# Patient Record
Sex: FEMALE | Race: BLACK OR AFRICAN AMERICAN | Employment: OTHER | URBAN - METROPOLITAN AREA
[De-identification: names, ages, dates, MRNs, and addresses within clinical notes are randomized per-mention and may not be internally consistent; named-entity substitution may affect disease eponyms.]

---

## 2022-10-13 ENCOUNTER — OFFICE VISIT (OUTPATIENT)
Dept: ORTHOPEDIC SURGERY | Age: 67
End: 2022-10-13
Payer: MEDICARE

## 2022-10-13 DIAGNOSIS — G89.29 CHRONIC LEFT-SIDED LOW BACK PAIN WITH LEFT-SIDED SCIATICA: ICD-10-CM

## 2022-10-13 DIAGNOSIS — M54.42 CHRONIC LEFT-SIDED LOW BACK PAIN WITH LEFT-SIDED SCIATICA: ICD-10-CM

## 2022-10-13 DIAGNOSIS — M25.552 LEFT HIP PAIN: ICD-10-CM

## 2022-10-13 DIAGNOSIS — M70.62 GREATER TROCHANTERIC BURSITIS OF LEFT HIP: Primary | ICD-10-CM

## 2022-10-13 PROBLEM — I10 HTN (HYPERTENSION): Status: ACTIVE | Noted: 2019-10-04

## 2022-10-13 PROBLEM — M81.0 OSTEOPOROSIS: Status: ACTIVE | Noted: 2019-10-04

## 2022-10-13 PROBLEM — K21.9 GASTROESOPHAGEAL REFLUX DISEASE: Status: ACTIVE | Noted: 2019-10-04

## 2022-10-13 PROBLEM — G47.33 OSA (OBSTRUCTIVE SLEEP APNEA): Status: ACTIVE | Noted: 2021-11-08

## 2022-10-13 PROBLEM — I51.89 GRADE I DIASTOLIC DYSFUNCTION: Status: ACTIVE | Noted: 2021-12-30

## 2022-10-13 PROBLEM — M48.12: Status: ACTIVE | Noted: 2020-03-11

## 2022-10-13 PROCEDURE — 20611 DRAIN/INJ JOINT/BURSA W/US: CPT | Performed by: PHYSICAL MEDICINE & REHABILITATION

## 2022-10-13 PROCEDURE — 1123F ACP DISCUSS/DSCN MKR DOCD: CPT | Performed by: PHYSICAL MEDICINE & REHABILITATION

## 2022-10-13 PROCEDURE — 3017F COLORECTAL CA SCREEN DOC REV: CPT | Performed by: PHYSICAL MEDICINE & REHABILITATION

## 2022-10-13 PROCEDURE — G8427 DOCREV CUR MEDS BY ELIG CLIN: HCPCS | Performed by: PHYSICAL MEDICINE & REHABILITATION

## 2022-10-13 PROCEDURE — G8432 DEP SCR NOT DOC, RNG: HCPCS | Performed by: PHYSICAL MEDICINE & REHABILITATION

## 2022-10-13 PROCEDURE — 99203 OFFICE O/P NEW LOW 30 MIN: CPT | Performed by: PHYSICAL MEDICINE & REHABILITATION

## 2022-10-13 PROCEDURE — G8421 BMI NOT CALCULATED: HCPCS | Performed by: PHYSICAL MEDICINE & REHABILITATION

## 2022-10-13 PROCEDURE — 1101F PT FALLS ASSESS-DOCD LE1/YR: CPT | Performed by: PHYSICAL MEDICINE & REHABILITATION

## 2022-10-13 PROCEDURE — G8756 NO BP MEASURE DOC: HCPCS | Performed by: PHYSICAL MEDICINE & REHABILITATION

## 2022-10-13 PROCEDURE — G8536 NO DOC ELDER MAL SCRN: HCPCS | Performed by: PHYSICAL MEDICINE & REHABILITATION

## 2022-10-13 PROCEDURE — 1090F PRES/ABSN URINE INCON ASSESS: CPT | Performed by: PHYSICAL MEDICINE & REHABILITATION

## 2022-10-13 RX ORDER — ALLOPURINOL 100 MG/1
TABLET ORAL
COMMUNITY
Start: 2022-07-27

## 2022-10-13 RX ORDER — LISINOPRIL 20 MG/1
TABLET ORAL
COMMUNITY

## 2022-10-13 RX ORDER — PRAVASTATIN SODIUM 10 MG/1
TABLET ORAL
COMMUNITY

## 2022-10-13 RX ORDER — FUROSEMIDE 40 MG/1
TABLET ORAL
COMMUNITY

## 2022-10-13 RX ORDER — ERGOCALCIFEROL 1.25 MG/1
CAPSULE ORAL
COMMUNITY

## 2022-10-13 RX ORDER — BUPIVACAINE HYDROCHLORIDE 2.5 MG/ML
3 INJECTION, SOLUTION INFILTRATION; PERINEURAL ONCE
Status: COMPLETED | OUTPATIENT
Start: 2022-10-13 | End: 2022-10-13

## 2022-10-13 RX ORDER — AMLODIPINE BESYLATE 5 MG/1
TABLET ORAL
COMMUNITY
Start: 2022-07-26

## 2022-10-13 RX ORDER — METHOCARBAMOL 750 MG/1
TABLET, FILM COATED ORAL
COMMUNITY
Start: 2022-09-08

## 2022-10-13 RX ORDER — METHYLPREDNISOLONE 4 MG/1
TABLET ORAL
COMMUNITY
Start: 2022-09-16 | End: 2022-10-13

## 2022-10-13 RX ORDER — ENOXAPARIN SODIUM 100 MG/ML
INJECTION SUBCUTANEOUS
COMMUNITY
End: 2022-10-13

## 2022-10-13 RX ORDER — FAMOTIDINE 20 MG/1
TABLET, FILM COATED ORAL
COMMUNITY
End: 2022-10-13

## 2022-10-13 RX ORDER — CELECOXIB 200 MG/1
CAPSULE ORAL
COMMUNITY
End: 2022-10-13

## 2022-10-13 RX ORDER — MELOXICAM 15 MG/1
TABLET ORAL
COMMUNITY

## 2022-10-13 RX ORDER — ACETAMINOPHEN 325 MG/1
650 TABLET ORAL 3 TIMES DAILY
COMMUNITY

## 2022-10-13 RX ORDER — TRAMADOL HYDROCHLORIDE 50 MG/1
TABLET ORAL
COMMUNITY
End: 2022-10-13

## 2022-10-13 RX ORDER — WARFARIN 3 MG/1
TABLET ORAL
COMMUNITY
End: 2022-10-13

## 2022-10-13 RX ORDER — POTASSIUM CHLORIDE 750 MG/1
TABLET, FILM COATED, EXTENDED RELEASE ORAL
COMMUNITY

## 2022-10-13 RX ORDER — TRIAMCINOLONE ACETONIDE 40 MG/ML
40 INJECTION, SUSPENSION INTRA-ARTICULAR; INTRAMUSCULAR ONCE
Status: COMPLETED | OUTPATIENT
Start: 2022-10-13 | End: 2022-10-13

## 2022-10-13 RX ADMIN — BUPIVACAINE HYDROCHLORIDE 7.5 MG: 2.5 INJECTION, SOLUTION INFILTRATION; PERINEURAL at 13:15

## 2022-10-13 RX ADMIN — TRIAMCINOLONE ACETONIDE 40 MG: 40 INJECTION, SUSPENSION INTRA-ARTICULAR; INTRAMUSCULAR at 13:16

## 2022-10-13 NOTE — PROGRESS NOTES
[address]  [address], [address]  [employer], [address] [place] [address] Street  Phone: [phone]  Fax: [phone]      Patient: Lobo Santana                                                                              MRN: [id number]        YOB: 1955          AGE: 77 y.o. PCP: None  Date:  10/13/22    Reason for Consultation: Hip Pain (Left) and Leg Pain (Left)      HPI:  Lobo Santana is a 77 y.o. female with relevant PMH of HTN, SHELBY, gout, prior PE- no longer on anticoagulation,  bilateral hip replacement  who presents with left low back/gluteal  pain radiating down the left leg towards the knee. The pain began August 2022. Denies any precipitating incident or trauma. Neurologic symptoms: + numbness- left leg, tingling, weakness, bowel or bladder changes. No recent falls      Location: The pain is located in the left low back pain/buttock pain   Radiation: The pain does radiate down the left lateral hip and leg towards the knee    Pain Score: Currently: 7/10    Quality: Pain is of a Stabbing quality. Aggravating: Pain is exacerbated by walking, standing, and exercise  Alleviating: The pain is alleviated by sitting    Prior Treatments:  Physical therapy: NO  Injections:NO    Previous Medications: prednisone- helped for a few weeks , celebrex, tramadol  Current Medications: methocarbamol, meloxicam  Previous work-up has included:     Past Medical History:   Past Medical History:   Diagnosis Date    HTN (hypertension)       Past Surgical History:   Past Surgical History:   Procedure Laterality Date    HX CATARACT REMOVAL Bilateral     HX CHOLECYSTECTOMY  2016    HX ORTHOPAEDIC Bilateral     hip repalcement 2017, 2013    HX TUBAL LIGATION        SocHx:   Social History     Tobacco Use    Smoking status: Never    Smokeless tobacco: Never   Substance Use Topics    Alcohol use: Never      FamHx:? History reviewed.  No pertinent family history. Current Medications:    Current Outpatient Medications   Medication Sig Dispense Refill    acetaminophen (TYLENOL) 325 mg tablet Take 650 mg by mouth three (3) times daily. allopurinoL (ZYLOPRIM) 100 mg tablet       amLODIPine (NORVASC) 5 mg tablet       ergocalciferol (ERGOCALCIFEROL) 1,250 mcg (50,000 unit) capsule ergocalciferol (vitamin D2) 1,250 mcg (50,000 unit) capsule   TAKE 1 CAPSULE BY MOUTH EVERY WEEK FOR VITAMIN D DEFICIENCY      furosemide (LASIX) 40 mg tablet furosemide 40 mg tablet   TAKE 1 TABLET BY MOUTH TWICE A DAY      lisinopriL (PRINIVIL, ZESTRIL) 20 mg tablet lisinopril 20 mg tablet   TAKE 1 TABLET BY MOUTH EVERY DAY      meloxicam (MOBIC) 15 mg tablet meloxicam 15 mg tablet   TAKE 1 TABLET BY MOUTH EVERY DAY WITH FOOD FOR ARTHRITIS      methocarbamoL (ROBAXIN) 750 mg tablet TAKE 2 TABLETS BY MOUTH TWICE A DAY AS NEEDED FOR PAIN      potassium chloride SR (KLOR-CON 10) 10 mEq tablet potassium chloride ER 10 mEq tablet,extended release   TAKE 1 TABLET BY MOUTH EVERY DAY      pravastatin (PRAVACHOL) 10 mg tablet pravastatin 10 mg tablet   TAKE 1 TABLET BY MOUTH EVERY DAY        Allergies: Allergies   Allergen Reactions    Penicillins Hives and Itching        Review of Systems:   Gen:    Denied fevers, chills, malaise, fatigue, weight changes   Resp: Denied shortness of breath, cough, wheezing   CVS: Denied chest pain, palpitations   : Denied urinary urgency, frequency, incontinence   GI: Denied nausea, vomiting, constipation, diarrhea   Skin: Denied rashes, wounds   Psych: Denied anxiety, depression   Vasc: Denied claudication, ulcers   Hem: Denied easy bruising/bleeding   MSK: See HPI   Neuro: See HPI         Physical Exam     Vital Signs: There were no vitals taken for this visit. General: ??????? Well nourished and well developed female without any acute distress   Psychiatric: ?  Alert and oriented x 3 with normal mood    HEENT: ????????   Atraumatic   Respiratory: Breathing non-labored and non dyspneic   CV: ???????????????? Peripheral pulses intact, no peripheral edema   Skin: ????????????? No rashes       Neurologic: ?? Sensation: normal and grossly intact thebilateral, lower extremity(s)   Strength: 5/5 in the bilateral, lower extremity(s)  Reflexes: reveals 2+ symmetric DTRs throughout  LE  Gait: normal     Musculoskeletal: Lumbar Exam     Inspection:   Alignment: Abnormal   Atrophy: None         Tenderness to Palpation:   Lumbar paraspinals Positive left  Lumbar spinous processes Negative  SI Joint:  Positive left  Gluteal:Positive  left  Greater trochanter: Positive++ left      ROM:   Lumbar ROM: Abnormal pain with flexion and extension  Lumbar facet loading: Positive  Hip ROM: No reproduction of pain with movement     Special Tests      Slump test: Negative  SLR: Negative  CHAVEZ: Positive low back  Log Roll: Negative             Medical Decision Making:    Images: The imaging results as well as the actual images of the studies below were reviewed, visualized and interpreted by me. Labs: The results below were reviewed. X-ray lumbar spine 10/13/22- anterolisthesis L4/5, scoliosis, left convex lumbar, disc space narrowing, anterior osteophytes  X-ray left hip limited view- hardware appears in place     Assessment:   -left GT bursitis  -lumbar spondylosis  -b/l hip replacement    Plan:      -Physical therapy -  referral to start PT for gluteal strengthening  -Medications - continue current medications. Counseled regarding side effects and appropriate administration of medications.    -Diagnostics/Imaging - x-ray lumbar spine , x-ray left hip   -Injections - left GT bursa injection- see procedure note below   -Lifestyle - Encouraged weight loss   -Education - The patient's diagnosis, prognosis and treatment options were discussed today. All questions were answered. F/U - in 8 week(s) or sooner if needed.   Consider further imaging lumbar spine [address] [facility]          [address]  [address], [address] [address],[address]  [address]  Phone: [phone]  Fax: [phone]      Encounter Date: 10/13/2022          Diagnosis: left GT bursitis      Indication: left lateral hip pain          Procedure: Sonographically guided left sub gluteus minimus injection         Informed Consent: Following denial of allergy and review of potential side effects and complications including, but not limited to, infection, allergic reaction, local tissue breakdown, injury to soft tissue and/or nerves and seizure, the patient indicated understanding and agreed to proceed. Procedural pause conducted to verify: correct patient identity, procedure to be performed and, as applicable, correct side and site, correct patient position, availability of any special equipment or other special requirements. Justification for use of ultrasound guidance: The use of direct sonographic visualization (rather than a non-guided injection) was required to ensure accurate injection placement for diagnostic specificity, to maximize clinical efficacy, and for safety purposes to minimize risk of bleeding or injury to nearby structures. Technique: The procedure was carried out under sterile technique utilizing a sterile ultrasound transducer cover and sterile ultrasound gel. Pre-procedural scanning was performed to determine optimal approach for the procedure. The patient was prepped and draped in the usual sterile fashion. Patient position: sidelying on the right side     Approach: posterior to anterior     Needle: 21 gauge 2 inch     Details: Live sonographic guidance with a 12 MHz transducer was used throughout the procedure. A 25 gauge 1.5 inch needle with 2 mL 2% polocaine- (lot [id number] exp 09/24)was used for local anesthesia.   Then a 21 gauge 2 inch needle was advanced into the target area 2 ml of 0.25% bupivacaine and 40mg /1ml kenalog was injected. Post-Procedure Instructions: The patient tolerated the procedure well without complication and was discharged in good condition after a short observation period. The patient was instructed to avoid submerging the procedure site in water for 48-72 hours. The patient was instructed to contact me with any questions pertaining to the procedure          Key images were saved.           Impression: Technically successful sonographically guided left sub gluteus medius bursa injection        Colleen Bearden MD

## 2022-10-13 NOTE — LETTER
NAME: Stefanie Phelps  : 1955  MRN: [id number]    PROCEDURAL INFORMED CONSENT FOR OPERATION / PROCEDURE     1. I (we),      Stefanie Lenin      authorize    Stewart Jurado MD       and/or such assistants as may be selected by him/her, to perform the following operation/procedures    Left greater trochanteric bursa injection ultrasound guided      Note: If unable to obtain consent prior to an emergent procedure, document the emergent reason in the medical record. This procedure has been explained to my (our) satisfaction and included in the explanation was:   A) the intended benefit, nature, and extent of the procedure to be performed;  B) the significant risks involved and the probability of success;  C) alternative procedures and methods of treatment;  D) the dangers and probable consequences of such alternatives (including no procedure or treatment); E) the expected consequences of the procedure on my future health;  F) whether other qualified individuals would be performing important surgical tasks and / or whether  would be present to advise or support the procedure. I (we) understand that there are other risks of infection and other serious complications in the pre-operative/procedural and postoperative/procedural stages of my (our) care. I (we) have asked all of the questions which I (we) thought were important in deciding whether or not to undergo treatment or diagnosis. These questions have been answered to my (our) satisfaction. I (we) understand that no assurance can be given that the procedure will be a success, and no guarantee or warranty of success has been given to me (us). 2.  It has been explained to me (us) that during the course of the operation/procedure, unforeseen conditions may be revealed that necessitate extension of the original procedure(s) or different procedure(s) than those set forth in Paragraph 1.  I (we) authorize and request that the above-named physician, his/her assistants or his/her designees, perform procedures as necessary and desirable if deemed to be in my (our) best interest.    3.  I acknowledge that other health care personnel may be observing this procedure for the purpose of medical education or other specified purposes as may be necessary as requested and/or approved by my (our) physician. 4.  I (we) consent to the disposal by the hospital Pathologist of the removed tissue, parts or organs in accordance with hospital policy. Page 1 of 2    NAME: Robert Ferguson  : 1955  MRN: [id number]    4. I do_____ do not______ consent to the use of a local infiltration pain blocking agent that will be used by my provider/surgical provider to help alleviate pain during my procedure. 6.  I do_____ do not_____ consent to an emergent blood transfusion in the case of a life-threatening situation that requires blood components to be administered. This consent is valid for 24 hours from the beginning of the procedure. 7.  This patient does _____ or does not ______currently have a DNR status/order. If DNR order is in place, obtain Addendum to the Surgical Consent for ALL Patients with a DNR Order to address jesse-operative status for limited intervention or DNR suspension. 8.  I have read and fully understand the above Consent for Operation/Procedure and that all blanks were completed before I signed the consent.       Robert Ferguson     Signature of Patient (or legal representative)  Printed Name / Relationship                10/13/2022 /         AM / PM   Witness to Signature  Printed Name   Date/Time        (If patient is unable to sign or is a minor, complete the following)               Patient is a minor, ____years of age, or unable to sign because: _____________________          ________________________________________________________________________  [facility] If a phone consent is obtained, consent will be documented by using two health care professionals, each affirming that the consenting party   has no questions and gives consent for the procedure discussed with the physician/provider. 10/13/2022 /               AM / PM   2nd Witness to phone consent  Printed Name   Date/Time     Informed Consent:  I have provided the explanation described above in section 1 to the patient and/or legal representative. I have provided the patient and/or legal representative with an opportunity to ask any questions about the proposed operation/procedure. Yara Cheney MD    10/13/2022   /            AM / PM   Provider / Proceduralist  Printed Name  Date/Time     This Provider / Gearl Kid performing the surgery is ONLY for Office-based procedures in [place]   [ x] Board certified or Board eligible by one of the [address] [place] [address] Rd., [address] of [employer], the [employer] of [facility] of the [facility], the [address] [place] [address] Rd., [address] of Podiatric Medicine, the [address] [place] [address] Rd., [address] of Foot and Ankle Surgery, or other board as approved by the hospital for medical staff appointment.             Page 2 of 2  Revised 8/2/2021

## 2022-10-13 NOTE — Clinical Note
Alverto Mclain, I am not able to see these patients x-rays in PACS- it say  \"The Accession Number: [id number] does not exist in the [address] [place] [address]. The data may have been deleted, moved or not yet added to the [facility] database.  \"  Thanks

## 2023-05-04 ENCOUNTER — OFFICE VISIT (OUTPATIENT)
Age: 68
End: 2023-05-04

## 2023-05-04 DIAGNOSIS — M54.16 RIGHT LUMBAR RADICULOPATHY: Primary | ICD-10-CM

## 2023-05-04 DIAGNOSIS — M25.561 CHRONIC PAIN OF BOTH KNEES: ICD-10-CM

## 2023-05-04 DIAGNOSIS — M70.62 GREATER TROCHANTERIC BURSITIS OF LEFT HIP: ICD-10-CM

## 2023-05-04 DIAGNOSIS — M25.562 CHRONIC PAIN OF BOTH KNEES: ICD-10-CM

## 2023-05-04 DIAGNOSIS — G89.29 CHRONIC PAIN OF BOTH KNEES: ICD-10-CM

## 2023-05-04 RX ORDER — LIDOCAINE HYDROCHLORIDE 10 MG/ML
5 INJECTION, SOLUTION INFILTRATION; PERINEURAL ONCE
Status: COMPLETED | OUTPATIENT
Start: 2023-05-04 | End: 2023-05-04

## 2023-05-04 RX ORDER — DIPHENHYDRAMINE HCL 2% ZINC ACETATE 0.1% 2; .1 G/100ML; G/100ML
SPRAY TOPICAL
COMMUNITY
Start: 2023-04-28

## 2023-05-04 RX ORDER — TRIAMCINOLONE ACETONIDE 40 MG/ML
40 INJECTION, SUSPENSION INTRA-ARTICULAR; INTRAMUSCULAR ONCE
Status: COMPLETED | OUTPATIENT
Start: 2023-05-04 | End: 2023-05-04

## 2023-05-04 RX ADMIN — TRIAMCINOLONE ACETONIDE 40 MG: 40 INJECTION, SUSPENSION INTRA-ARTICULAR; INTRAMUSCULAR at 09:34

## 2023-05-04 RX ADMIN — LIDOCAINE HYDROCHLORIDE 5 ML: 10 INJECTION, SOLUTION INFILTRATION; PERINEURAL at 09:32

## 2023-05-04 NOTE — PROGRESS NOTES
[address]  [address], [address]   [place], [address] [place] [address] Street   Phone: [phone]   Fax: [phone]         Patient: Erasmo Ny                                                                               MRN: [id number]         YOB: 1955           AGE: 77 y.o. PCP: None     Reason for Consultation: Hip Pain (Left) and Leg Pain (Left)         HPI:   Erasmo Ny is a 77 y.o. female with relevant PMH of HTN, RAMESH, gout, prior PE- no longer on anticoagulation,  bilateral hip replacement  who presented with left low back/gluteal  pain radiating  down the left leg towards the knee. We tried a GT bursa injection 10/2023 which helped. Today she reports pain radiating down the left leg towards the foot       The pain began August 2022. Denies any precipitating incident or trauma. She also reports bilateral knee pain. Has had viscosupplementation in the past which has been helpful        Neurologic symptoms: + numbness- left leg, tingling, weakness, bowel or bladder changes. No recent falls        Location: The pain is located in the left low back pain/buttock pain    Radiation: The pain does radiate down the left lateral hip and leg towards the knee  and foot   Pain Score: Currently: 7/10     Quality: Pain is of a Stabbing quality. Aggravating: Pain is exacerbated by walking, standing, and exercise   Alleviating:  The pain is alleviated by sitting      Prior Treatments:   Physical therapy: NO   Injections:   10/13/23- left GT bursa injection- good relief   2020- knee bilateral viscosupplementation   Previous Medications: prednisone- helped for a few weeks , celebrex, tramadol   Current Medications: methocarbamol, meloxicam   Previous work-up has included:     X-ray lumbar spine 10/13/22- anterolisthesis L4/5, scoliosis, left convex lumbar, disc space narrowing, anterior osteophytes   X-ray left hip limited view-

## 2023-05-12 ENCOUNTER — HOSPITAL ENCOUNTER (OUTPATIENT)
Facility: HOSPITAL | Age: 68
Setting detail: RECURRING SERIES
Discharge: HOME | End: 2023-05-15
Payer: MEDICARE

## 2023-05-12 PROCEDURE — 97110 THERAPEUTIC EXERCISES: CPT

## 2023-05-12 PROCEDURE — 97162 PT EVAL MOD COMPLEX 30 MIN: CPT

## 2023-05-22 ENCOUNTER — HOSPITAL ENCOUNTER (OUTPATIENT)
Facility: HOSPITAL | Age: 68
Setting detail: RECURRING SERIES
Discharge: HOME | End: 2023-05-25
Payer: MEDICARE

## 2023-05-22 PROCEDURE — 97112 NEUROMUSCULAR REEDUCATION: CPT

## 2023-05-22 PROCEDURE — 97110 THERAPEUTIC EXERCISES: CPT

## 2023-05-22 NOTE — PROGRESS NOTES
PHYSICAL / OCCUPATIONAL THERAPY - DAILY TREATMENT NOTE (updated )    Patient Name: Jas Blas    Date: 2023    : 1955  Insurance: Payor: Franky Mendosa / Plan: Dee Agustin / Product Type: *No Product type* /      Patient  verified Yes     Visit #   Current / Total 2 10   Time   In / Out 1:00 1:40   Pain   In / Out 6 6   Subjective Functional Status/Changes: \"Still sore in that left leg. \"   Changes to:  Meds, Allergies, Med Hx, Sx Hx? If yes, update Summary List yes       TREATMENT AREA =  Other low back pain [M54.59]  Radiculopathy, lumbar region [M54.16]    OBJECTIVE    Therapeutic Procedures: Tx Min Billable or 1:1 Min (if diff from Tx Min) Procedure, Rationale, Specifics   15 15 30537 Therapeutic Exercise (timed):  increase ROM, strength, coordination, balance, and proprioception to improve patient's ability to progress to PLOF and address remaining functional goals. (see flow sheet as applicable)     Details if applicable:  LE strengthening and ROM     15 15 11365 Neuromuscular Re-Education (timed):  improve balance, coordination, kinesthetic sense, posture, core stability and proprioception to improve patient's ability to develop conscious control of individual muscles and awareness of position of extremities in order to progress to PLOF and address remaining functional goals.  (see flow sheet as applicable)       Details if applicable:  core and glute re-ed     30 30 MC BC Totals Reminder: bill using total billable min of TIMED therapeutic procedures (example: do not include dry needle or estim unattended, both untimed codes, in totals to left)  8-22 min = 1 unit; 23-37 min = 2 units; 38-52 min = 3 units; 53-67 min = 4 units; 68-82 min = 5 units   Total Total     [x]  Patient Education billed concurrently with other procedures  [x] Review HEP    [] Progressed/Changed HEP, detail:  [] Other detail:    Objective Information/Functional

## 2023-05-25 ENCOUNTER — HOSPITAL ENCOUNTER (OUTPATIENT)
Facility: HOSPITAL | Age: 68
Setting detail: RECURRING SERIES
Discharge: HOME | End: 2023-05-28
Payer: MEDICARE

## 2023-05-25 PROCEDURE — 97110 THERAPEUTIC EXERCISES: CPT

## 2023-05-25 PROCEDURE — 97112 NEUROMUSCULAR REEDUCATION: CPT

## 2023-05-31 ENCOUNTER — HOSPITAL ENCOUNTER (OUTPATIENT)
Facility: HOSPITAL | Age: 68
Setting detail: RECURRING SERIES
Discharge: HOME | End: 2023-06-03
Payer: MEDICARE

## 2023-05-31 PROCEDURE — 97112 NEUROMUSCULAR REEDUCATION: CPT

## 2023-05-31 PROCEDURE — 97110 THERAPEUTIC EXERCISES: CPT

## 2023-05-31 NOTE — PROGRESS NOTES
care  [x]  Upgrade activities as tolerated  []  Discharge due to :  []  Other:     Mark Molina, PAOLO    5/31/2023    8:18 AM    Future Appointments   Date Time Provider Meli Boyle   5/31/2023 11:40 AM Mark Molina, PT [facility]   6/2/2023 12:20 PM [facility]   6/8/2023 11:45 AM Robb Orozco MD [facility]

## 2023-06-02 ENCOUNTER — APPOINTMENT (OUTPATIENT)
Facility: HOSPITAL | Age: 68
End: 2023-06-02
Payer: MEDICARE

## 2023-06-07 ENCOUNTER — HOSPITAL ENCOUNTER (OUTPATIENT)
Facility: HOSPITAL | Age: 68
Setting detail: RECURRING SERIES
Discharge: HOME | End: 2023-06-10
Payer: MEDICARE

## 2023-06-07 PROCEDURE — 97110 THERAPEUTIC EXERCISES: CPT

## 2023-06-07 PROCEDURE — 97530 THERAPEUTIC ACTIVITIES: CPT

## 2023-06-07 PROCEDURE — 97112 NEUROMUSCULAR REEDUCATION: CPT

## 2023-06-07 NOTE — PROGRESS NOTES
PHYSICAL / OCCUPATIONAL THERAPY - DAILY TREATMENT NOTE (updated )    Patient Name: Abby Gaffney    Date: 2023    : 1955  Insurance: Payor: Thaddeus Swartz / Plan: [facility] [address] / Product Type: *No Product type* /      Patient  verified yes     Visit #   Current / Total 5 10 Total Time   Time   In / Out 8:20 9:26 66   Pain   In / Out 3 3    Subjective Functional Status/Changes: Therapy has been helpful. My pain is less today. Changes to:  Meds, Allergies, Med Hx, Sx Hx? If yes, update Summary List no        TREATMENT AREA =  Other low back pain [M54.59]  Radiculopathy, lumbar region [M54.16]    OBJECTIVE    Modalities:  Modalities Rationale:     decrease pain and improve tissue extensibility to improve patient's ability to progress to PLOF and address remaining functional goals. -  10 min   []  Ice     [x]  Heat    location/position: Supine w/ wedge, low back    Skin assessment post-treatment (if applicable):    [x]  intact    []  redness- no adverse reaction                 []redness - adverse reaction:             Therapeutic Procedures:  56  Total 54  Total [facility] Totals Reminder: bill using total billable min of TIMED therapeutic procedures (example: do not include dry needle or estim unattended, both untimed codes, in totals to left)  8-22 min = 1 unit; 23-37 min = 2 units; 38-52 min = 3 units; 53-67 min = 4 units; 68-82 min = 5 units   Tx Min Billable or 1:1 Min (if diff from Tx Min) Procedure, Rationale, Specifics   27 25 31694 Therapeutic Exercise (timed):  increase ROM, strength, coordination, balance, and proprioception to improve patient's ability to progress to PLOF and address remaining functional goals.  (see flow sheet as applicable)   Details if applicable:        [address] Therapeutic Activity (timed):  use of dynamic activities replicating functional movements to increase ROM, strength, coordination, balance, and proprioception in
minutes of standing,   % improvement: 45%  Pain   Average: 5/10                  Best: 3/10                Worst: 9/10  Patient Goal: \"no more back pain, be more mobile\"  Objective Measures:   Lumbar AROM:  Flexion 90%   Extension 25%   Right Lateral Flexion 50%   Left Lateral Flexion 50%   Right Rotation 50%   Left Rotation 50%      Strength:  Manual Muscle Testing: Right Left   Hip Flexion 5 5   Knee Extension 5 5   Knee Flexion 5 5   Ankle Dorsiflexion 5 5   Ankle Eversion 4 4   Hip Abduction 4+ 4   Hip Extension 3- 3-   Hip External Rotation 4 4   Hip Internal Rotation 5 5      Repeated Spinal Movement Testing: reduction of symptoms with flexion biased interventions and PPT     Stairs: step to gait bilateral rails ascend and descend 4 steps     Transfers: 30\" STS = 9 repetitions     Balance: SLS left 3 seconds, right 7 seconds         Goals:  Short Term Goals: To be accomplished in 4 weeks  Patient will report daily compliance with HEP to improve tolerance to ADLs. Status at last assessment: Edu in HEP at IE  Current: progressing; reporting daily compliance with HEP (5/22/23)  2. Patient will report pain 4/10 at worst to improve tolerance to standing and walking. Status at last assessment: pain 9/10 at worst  Current: not met, continues to be 9/10 pain at worst, however pain is less frequent (6/7/23)  3. Patient will increase left hip extension strength to 4/5 to improve sit to stand and bed mobility. Status at last assessment: left hip ext 3+/5  Current: not met, 3-/5 (6/7/23)  4. Patient will increase left hip flexion to 4/5 strength pain free to improve stair negotiation. Status at last assessment: left hip flexion 4-/5  Current: met, 5/5 without pain (6/7/23)    Reporting Period: (date from last Prog Note/Eval to current Prog Note/Recert)  5/17/46 - 7/1/60    RECOMMENDATIONS  Continue therapy per initial Plan of Care or most recent Medicare Recert.       If you have any questions/comments please contact

## 2023-06-08 ENCOUNTER — OFFICE VISIT (OUTPATIENT)
Age: 68
End: 2023-06-08
Payer: MEDICARE

## 2023-06-08 VITALS
WEIGHT: 251 LBS | TEMPERATURE: 98.2 F | HEART RATE: 99 BPM | OXYGEN SATURATION: 96 % | HEIGHT: 67 IN | BODY MASS INDEX: 39.39 KG/M2

## 2023-06-08 DIAGNOSIS — M54.42 CHRONIC BILATERAL LOW BACK PAIN WITH LEFT-SIDED SCIATICA: Primary | ICD-10-CM

## 2023-06-08 DIAGNOSIS — M25.562 CHRONIC PAIN OF BOTH KNEES: ICD-10-CM

## 2023-06-08 DIAGNOSIS — M17.0 PRIMARY OSTEOARTHRITIS OF BOTH KNEES: ICD-10-CM

## 2023-06-08 DIAGNOSIS — M25.561 CHRONIC PAIN OF BOTH KNEES: ICD-10-CM

## 2023-06-08 DIAGNOSIS — G89.29 CHRONIC PAIN OF BOTH KNEES: ICD-10-CM

## 2023-06-08 DIAGNOSIS — G89.29 CHRONIC BILATERAL LOW BACK PAIN WITH LEFT-SIDED SCIATICA: Primary | ICD-10-CM

## 2023-06-08 PROCEDURE — 1123F ACP DISCUSS/DSCN MKR DOCD: CPT | Performed by: PHYSICAL MEDICINE & REHABILITATION

## 2023-06-08 PROCEDURE — 99214 OFFICE O/P EST MOD 30 MIN: CPT | Performed by: PHYSICAL MEDICINE & REHABILITATION

## 2023-06-08 RX ORDER — DOXYCYCLINE HYCLATE 100 MG
TABLET ORAL
COMMUNITY

## 2023-06-08 RX ORDER — FAMOTIDINE 20 MG/1
TABLET, FILM COATED ORAL
COMMUNITY
Start: 2023-05-26

## 2023-06-08 RX ORDER — MEDROXYPROGESTERONE ACETATE 10 MG/1
1 TABLET ORAL DAILY
COMMUNITY

## 2023-06-08 RX ORDER — MELOXICAM 7.5 MG/1
TABLET ORAL
COMMUNITY
Start: 2023-06-01

## 2023-06-08 ASSESSMENT — PATIENT HEALTH QUESTIONNAIRE - PHQ9
1. LITTLE INTEREST OR PLEASURE IN DOING THINGS: 0
SUM OF ALL RESPONSES TO PHQ9 QUESTIONS 1 & 2: 0
2. FEELING DOWN, DEPRESSED OR HOPELESS: 0
SUM OF ALL RESPONSES TO PHQ QUESTIONS 1-9: 0

## 2023-06-08 NOTE — PATIENT INSTRUCTIONS
[address] [facility]    Please expect an automated call within 24-48 business hours to schedule your outpatient study with [facility]    If you have not received an automated call, please call [phone] to speak directly with a     [address] at [address]

## 2023-06-08 NOTE — PROGRESS NOTES
Baldemar Tyler presents today for   Chief Complaint   Patient presents with    Back Pain       Is someone accompanying this pt? no    Is the patient using any DME equipment during OV? no    Depression Screening:  No flowsheet data found. Learning Assessment:  No flowsheet data found. Abuse Screening:  No flowsheet data found. Fall Risk  No flowsheet data found. OPIOID RISK TOOL  No flowsheet data found. Coordination of Care:  1. Have you been to the ER, urgent care clinic since your last visit? no  Hospitalized since your last visit? no    2. Have you seen or consulted any other health care providers outside of the [address] [place] [address] since your last visit? no Include any pap smears or colon screening.  no
(KLOR-CON) 10 MEQ extended release tablet potassium chloride ER 10 mEq tablet,extended release   TAKE 1 TABLET BY MOUTH EVERY DAY      pravastatin (PRAVACHOL) 10 MG tablet pravastatin 10 mg tablet   TAKE 1 TABLET BY MOUTH EVERY DAY      meloxicam (MOBIC) 15 MG tablet meloxicam 15 mg tablet   TAKE 1 TABLET BY MOUTH EVERY DAY WITH FOOD FOR ARTHRITIS (Patient not taking: Reported on 6/8/2023)       No current facility-administered medications for this visit. Allergies   Allergen Reactions    Penicillins Hives, Itching and Rash         Physical Exam       Vital Signs: There were no vitals taken for this visit. General: ??????? Well nourished and well developed female without any acute distress    Psychiatric: ?  Alert and oriented x 3 with normal mood     HEENT: ???????? Atraumatic    Respiratory:   Breathing non-labored and non dyspneic    CV: ???????????????? Peripheral pulses intact, no peripheral edema    Skin: ????????????? No rashes          Neurologic: ??         Sensation: normal and grossly intact thebilateral, lower extremity(s)    Strength: 5/5 in the bilateral, lower extremity(s)   Reflexes: reveals 2+ symmetric DTRs throughout  LE   Gait: normal       Musculoskeletal: Lumbar Exam       Inspection:    Alignment: Abnormal    Atrophy: None             Tenderness to Palpation:    Lumbar paraspinals Positive left   Lumbar spinous processes Negative   SI Joint:  Positive left   Gluteal:Positive  left   Greater trochanter: Neg         ROM:    Lumbar ROM: Abnormal pain with flexion and extension   Lumbar facet loading: Positive   Hip ROM: No reproduction of pain with movement       Special Tests        Slump test: Negative   SLR: Negative   CHI: Positive low back   Log Roll: Negative         - Tender bilateral medial and lateral joint line  -pain with knee flexion   + crepitus      Assessment:    -left GT bursitis   -lumbar spondylosis- left lumbar radiculitis  - severe bilateral knee OA   -b/l hip

## 2023-07-13 ENCOUNTER — HOSPITAL ENCOUNTER (OUTPATIENT)
Facility: HOSPITAL | Age: 68
Discharge: HOME | End: 2023-07-13
Attending: PHYSICAL MEDICINE & REHABILITATION
Payer: MEDICARE

## 2023-07-13 DIAGNOSIS — M54.42 CHRONIC BILATERAL LOW BACK PAIN WITH LEFT-SIDED SCIATICA: ICD-10-CM

## 2023-07-13 DIAGNOSIS — G89.29 CHRONIC BILATERAL LOW BACK PAIN WITH LEFT-SIDED SCIATICA: ICD-10-CM

## 2023-07-13 PROCEDURE — 72148 MRI LUMBAR SPINE W/O DYE: CPT

## 2023-07-27 ENCOUNTER — PROCEDURE VISIT (OUTPATIENT)
Age: 68
End: 2023-07-27
Payer: MEDICARE

## 2023-07-27 VITALS
TEMPERATURE: 98.3 F | HEART RATE: 82 BPM | HEIGHT: 67 IN | BODY MASS INDEX: 39.39 KG/M2 | SYSTOLIC BLOOD PRESSURE: 130 MMHG | OXYGEN SATURATION: 96 % | WEIGHT: 251 LBS | DIASTOLIC BLOOD PRESSURE: 84 MMHG

## 2023-07-27 DIAGNOSIS — M17.0 PRIMARY OSTEOARTHRITIS OF BOTH KNEES: ICD-10-CM

## 2023-07-27 DIAGNOSIS — M25.561 CHRONIC PAIN OF BOTH KNEES: ICD-10-CM

## 2023-07-27 DIAGNOSIS — M54.16 RIGHT LUMBAR RADICULOPATHY: ICD-10-CM

## 2023-07-27 DIAGNOSIS — G89.29 CHRONIC PAIN OF BOTH KNEES: ICD-10-CM

## 2023-07-27 DIAGNOSIS — G89.29 CHRONIC BILATERAL LOW BACK PAIN WITH LEFT-SIDED SCIATICA: Primary | ICD-10-CM

## 2023-07-27 DIAGNOSIS — M54.42 CHRONIC BILATERAL LOW BACK PAIN WITH LEFT-SIDED SCIATICA: Primary | ICD-10-CM

## 2023-07-27 DIAGNOSIS — M25.562 CHRONIC PAIN OF BOTH KNEES: ICD-10-CM

## 2023-07-27 PROCEDURE — 3079F DIAST BP 80-89 MM HG: CPT | Performed by: PHYSICAL MEDICINE & REHABILITATION

## 2023-07-27 PROCEDURE — 99214 OFFICE O/P EST MOD 30 MIN: CPT | Performed by: PHYSICAL MEDICINE & REHABILITATION

## 2023-07-27 PROCEDURE — 1123F ACP DISCUSS/DSCN MKR DOCD: CPT | Performed by: PHYSICAL MEDICINE & REHABILITATION

## 2023-07-27 PROCEDURE — 3075F SYST BP GE 130 - 139MM HG: CPT | Performed by: PHYSICAL MEDICINE & REHABILITATION

## 2023-07-27 ASSESSMENT — PATIENT HEALTH QUESTIONNAIRE - PHQ9
SUM OF ALL RESPONSES TO PHQ QUESTIONS 1-9: 0
1. LITTLE INTEREST OR PLEASURE IN DOING THINGS: 0
2. FEELING DOWN, DEPRESSED OR HOPELESS: 0
SUM OF ALL RESPONSES TO PHQ QUESTIONS 1-9: 0
SUM OF ALL RESPONSES TO PHQ9 QUESTIONS 1 & 2: 0

## 2023-07-27 NOTE — PROGRESS NOTES
Haylee Glover presents today for   Chief Complaint   Patient presents with    Knee Pain     Bilateral       Is someone accompanying this pt? no    Is the patient using any DME equipment during OV? no    Depression Screening:  No flowsheet data found. Learning Assessment:  No flowsheet data found. Abuse Screening:  No flowsheet data found. Fall Risk  No flowsheet data found. OPIOID RISK TOOL  No flowsheet data found. Coordination of Care:  1. Have you been to the ER, urgent care clinic since your last visit? no  Hospitalized since your last visit? no    2. Have you seen or consulted any other health care providers outside of the [address] [place] [address] since your last visit? no Include any pap smears or colon screening.  no

## 2023-07-27 NOTE — PROGRESS NOTES
[facility]  [address], [address] [place] [address]    Phone: [phone]   Fax: [phone]         Patient: Last Bruce                                                                               MRN: [id number]         YOB: 1955           AGE: 77 y.o. PCP: None     Reason for Consultation: Hip Pain (Left) and Leg Pain (Left)         HPI:   Last Bruce is a 77 y.o. female with relevant PMH of HTN, RAMESH, gout, prior PE- no longer on anticoagulation,  bilateral hip replacement  who presented with left low back/gluteal  pain radiating  down the left leg towards the knee. We tried a GT bursa injection 10/2023 and again 5/12/23 which helped. Today she reports pain radiating down the left leg towards the foot . She also has bilateral knee pain- severe bilateral knee OA. Mri lumbar spine 7/13/23 with severe DDD L2-3 , L4-5 disc herniation, facet rathritis L3-S1 . The pain began August 2022. Denies any precipitating incident or trauma. She also reports bilateral knee pain. Has had viscosupplementation in the past which has been helpful. She was scheduled to start euflexxa today but because of the issue with [employer] and [address] [facility] [place] [address] if we start today she may not be able to continue the injections after 8/1/2023      Neurologic symptoms: + numbness- left leg, tingling, weakness, bowel or bladder changes. No recent falls        Location: The pain is located in the left low back pain/buttock pain    Radiation: The pain does radiate down the left lateral hip and leg towards the knee  and foot   Pain Score: Currently: 7/10     Quality: Pain is of a Stabbing quality. Aggravating: Pain is exacerbated by walking, standing, and exercise   Alleviating:  The pain is alleviated by sitting      Prior Treatments:   Physical therapy: Yes started 5/4/23- currently in PT    Injections:   10/13/23- left GT bursa

## 2023-08-01 ENCOUNTER — TELEPHONE (OUTPATIENT)
Age: 68
End: 2023-08-01

## 2023-08-01 NOTE — TELEPHONE ENCOUNTER
Pearl Hoyos from [employer] called on behalf of patient regarding gel injection series and insurance no longer being in network. Patient is currently in middle of her Euflexxa series with two more injections needed which are scheduled for 8/3 and 8/10 with expiration date of 8/14. I informed Pearl Hoyos that the instructions we were given are that the patient is to submit for Continuity of Care through [facility], which in turn she told me they have been informed that the doctor of the patient is to do so. Pearl Hoyos stated she would contact [employer] Authorization department.      If there are any questions or further information from [place] we can contact Daphne at: [phone]   Interaction # [id number]

## 2023-08-01 NOTE — TELEPHONE ENCOUNTER
Lux Luna from [facility] called back to provide [employer] Utilization Management Dept phone # [phone].

## 2023-08-03 ENCOUNTER — PROCEDURE VISIT (OUTPATIENT)
Age: 68
End: 2023-08-03

## 2023-08-03 VITALS — WEIGHT: 251 LBS | HEIGHT: 67 IN | BODY MASS INDEX: 39.39 KG/M2 | TEMPERATURE: 98 F

## 2023-08-03 DIAGNOSIS — M54.42 CHRONIC BILATERAL LOW BACK PAIN WITH LEFT-SIDED SCIATICA: Primary | ICD-10-CM

## 2023-08-03 DIAGNOSIS — G89.29 CHRONIC BILATERAL LOW BACK PAIN WITH LEFT-SIDED SCIATICA: Primary | ICD-10-CM

## 2023-08-03 RX ORDER — HYALURONATE SODIUM 10 MG/ML
20 SYRINGE (ML) INTRAARTICULAR ONCE
Status: COMPLETED | OUTPATIENT
Start: 2023-08-03 | End: 2023-08-03

## 2023-08-03 RX ORDER — LIDOCAINE HYDROCHLORIDE 10 MG/ML
5 INJECTION, SOLUTION INFILTRATION; PERINEURAL ONCE
Status: COMPLETED | OUTPATIENT
Start: 2023-08-03 | End: 2023-08-03

## 2023-08-03 RX ADMIN — Medication 20 MG: at 13:15

## 2023-08-03 RX ADMIN — LIDOCAINE HYDROCHLORIDE 5 ML: 10 INJECTION, SOLUTION INFILTRATION; PERINEURAL at 13:15

## 2023-08-03 RX ADMIN — Medication 20 MG: at 13:14

## 2023-08-03 NOTE — PROGRESS NOTES
Alcides Valdes presents today for   Chief Complaint   Patient presents with    Knee Pain     bilateral       Is someone accompanying this pt? no    Is the patient using any DME equipment during OV? no    Depression Screening:  No flowsheet data found. Learning Assessment:  No flowsheet data found. Abuse Screening:  No flowsheet data found. Fall Risk  No flowsheet data found. OPIOID RISK TOOL  No flowsheet data found. Coordination of Care:  1. Have you been to the ER, urgent care clinic since your last visit? no  Hospitalized since your last visit? no    2. Have you seen or consulted any other health care providers outside of the [address] [place] [address] since your last visit? no Include any pap smears or colon screening.  no

## 2023-08-03 NOTE — PROGRESS NOTES
Matthias Claudia  [address], [address]  [facility], [address]   Phone: [phone]  Fax: [phone]      Encounter Date: 8/3/2023          Diagnosis:    Diagnosis Orders   1. Chronic bilateral low back pain with left-sided sciatica  AMB POC US DRAIN/INJECT LARGE JOINT/BURSA    lidocaine 1 % injection 5 mL    sodium hyaluronate (EUFLEXXA, HYALGAN) injection 20 mg    sodium hyaluronate (EUFLEXXA, HYALGAN) injection 20 mg                Requesting Provider: Oral Montes*          Indication: bilateral knee arthritis           Procedure: Sonographically guided  left and right knee ultrasound guided euflexxa injection         Informed Consent: Following denial of allergy and review of potential side effects and complications including, but not limited to, infection, allergic reaction, local tissue breakdown, injury to soft tissue and/or nerves and seizure, the patient indicated understanding and agreed to proceed. Procedural pause conducted to verify: correct patient identity, procedure to be performed and, as applicable, correct side and site, correct patient position, availability of any special equipment or other special requirements. Justification for use of ultrasound guidance: The use of direct sonographic visualization (rather than a non-guided injection) was required to ensure accurate injection placement for diagnostic specificity, to maximize clinical efficacy, and for safety purposes to minimize risk of bleeding or injury to nearby structures. Technique: The procedure was carried out under sterile technique utilizing a sterile ultrasound transducer cover and sterile ultrasound gel. Pre-procedural scanning was performed to determine optimal approach for the procedure. The patient was prepped and draped in the usual sterile fashion.            Patient position: supine knee flexed  30 degrees      Approach: lateral to medial

## 2023-08-03 NOTE — TELEPHONE ENCOUNTER
Debbie Clemens  You; Lety Russell 22 hours ago (4:42 PM)     DM  I called and informed patient that I received a preauth for her to continue her injections, but she will need to complete a [employer] form w/[employer] that can be done up to 30 days from when her insurance became out-of-network. I will send a form to [facility] office to be given to patient tomorrow for her to complete. We will need to fax to [facility] and scan copy with confirmation into her chart once done. [address] [place] [address] Department provided me a preauth for CPT 65941 for Bilateral Knees, AUTH# [id number] for date range of 8-3-23 thru 10-31-23 to cover patient for her remaining 2 Euflexxa injections to complete series. Spoke with Sarwat Nicholson, Call Ref# [id number] 8-2-23 4:30 p.m. Spoke with Vika Sloan/[facility] Department regarding being out-of-network for prior auth we received for before 8-1-23 for Euflexxa [id number]. I was informed \"If Doctor is on prior Davida Álvarez will be completing the services\"but needed to \"request a  service agreement preaut for payment which is noted above. I had to then reach out to the [address] [place] [address] and request to speak to a [address] [place] [address] Nurse. Call Ref# is [id number] 8-2-23 at 12:22 p.m.

## 2023-08-10 ENCOUNTER — PROCEDURE VISIT (OUTPATIENT)
Age: 68
End: 2023-08-10

## 2023-08-10 VITALS — BODY MASS INDEX: 39.39 KG/M2 | WEIGHT: 251 LBS | HEIGHT: 67 IN | HEART RATE: 60 BPM | OXYGEN SATURATION: 97 %

## 2023-08-10 DIAGNOSIS — G89.29 CHRONIC PAIN OF BOTH KNEES: Primary | ICD-10-CM

## 2023-08-10 DIAGNOSIS — M17.0 PRIMARY OSTEOARTHRITIS OF BOTH KNEES: ICD-10-CM

## 2023-08-10 DIAGNOSIS — M25.562 CHRONIC PAIN OF BOTH KNEES: Primary | ICD-10-CM

## 2023-08-10 DIAGNOSIS — M25.561 CHRONIC PAIN OF BOTH KNEES: Primary | ICD-10-CM

## 2023-08-10 RX ORDER — HYALURONATE SODIUM 10 MG/ML
20 SYRINGE (ML) INTRAARTICULAR ONCE
Status: COMPLETED | OUTPATIENT
Start: 2023-08-10 | End: 2023-08-10

## 2023-08-10 RX ORDER — LIDOCAINE HYDROCHLORIDE 10 MG/ML
6 INJECTION, SOLUTION INFILTRATION; PERINEURAL ONCE
Status: COMPLETED | OUTPATIENT
Start: 2023-08-10 | End: 2023-08-10

## 2023-08-10 RX ADMIN — Medication 20 MG: at 13:20

## 2023-08-10 RX ADMIN — Medication 20 MG: at 13:19

## 2023-08-10 RX ADMIN — LIDOCAINE HYDROCHLORIDE 6 ML: 10 INJECTION, SOLUTION INFILTRATION; PERINEURAL at 13:18

## 2023-08-10 ASSESSMENT — PATIENT HEALTH QUESTIONNAIRE - PHQ9
SUM OF ALL RESPONSES TO PHQ QUESTIONS 1-9: 0
SUM OF ALL RESPONSES TO PHQ9 QUESTIONS 1 & 2: 0
2. FEELING DOWN, DEPRESSED OR HOPELESS: 0
SUM OF ALL RESPONSES TO PHQ QUESTIONS 1-9: 0
1. LITTLE INTEREST OR PLEASURE IN DOING THINGS: 0

## 2023-08-10 NOTE — PROGRESS NOTES
Ted Salvador presents today for   Chief Complaint   Patient presents with    Knee Pain       Is someone accompanying this pt? no    Is the patient using any DME equipment during OV? no    Depression Screening:  No flowsheet data found. Learning Assessment:  No flowsheet data found. Abuse Screening:  No flowsheet data found. Fall Risk  No flowsheet data found. OPIOID RISK TOOL  No flowsheet data found. Coordination of Care:  1. Have you been to the ER, urgent care clinic since your last visit? no  Hospitalized since your last visit? no    2. Have you seen or consulted any other health care providers outside of the [address] [place] [address] since your last visit? n Include any pap smears or colon screening.  no

## 2023-08-10 NOTE — PROGRESS NOTES
[address] [facility], [address] [place], [place] [address] [place]   Phone: [phone]  Fax: [phone]      Encounter Date: 8/10/2023          Diagnosis:    Diagnosis Orders   1. Chronic pain of both knees  AMB POC US DRAIN/INJECT LARGE JOINT/BURSA    lidocaine 1 % injection 6 mL    sodium hyaluronate (EUFLEXXA, HYALGAN) injection 20 mg    sodium hyaluronate (EUFLEXXA, HYALGAN) injection 20 mg      2. Primary osteoarthritis of both knees  AMB POC US DRAIN/INJECT LARGE JOINT/BURSA    lidocaine 1 % injection 6 mL    sodium hyaluronate (EUFLEXXA, HYALGAN) injection 20 mg    sodium hyaluronate (EUFLEXXA, HYALGAN) injection 20 mg                  Requesting Provider: Oral Omer*          Indication: bilateral knee arthritis           Procedure: Sonographically guided  left and right knee ultrasound guided euflexxa injection         Informed Consent: Following denial of allergy and review of potential side effects and complications including, but not limited to, infection, allergic reaction, local tissue breakdown, injury to soft tissue and/or nerves and seizure, the patient indicated understanding and agreed to proceed. Procedural pause conducted to verify: correct patient identity, procedure to be performed and, as applicable, correct side and site, correct patient position, availability of any special equipment or other special requirements. Justification for use of ultrasound guidance: The use of direct sonographic visualization (rather than a non-guided injection) was required to ensure accurate injection placement for diagnostic specificity, to maximize clinical efficacy, and for safety purposes to minimize risk of bleeding or injury to nearby structures. Technique: The procedure was carried out under sterile technique utilizing a sterile ultrasound transducer cover and sterile ultrasound gel.  Pre-procedural scanning was

## 2023-09-21 ENCOUNTER — TELEPHONE (OUTPATIENT)
Facility: CLINIC | Age: 68
End: 2023-09-21

## 2023-09-21 NOTE — TELEPHONE ENCOUNTER
----- Message from Minnie Marquez sent at 9/21/2023  9:52 AM EDT -----  Subject: Appointment Request    Reason for Call: Established Patient Appointment needed: Routine Existing   Condition Follow Up    QUESTIONS    Reason for appointment request? No appointments available during search     Additional Information for Provider?  Patient needs an injection.   ---------------------------------------------------------------------------  --------------  Michaelt Leaks INFO  [phone]; OK to leave message on voicemail,OK to respond with electronic   message via [employer] portal (only for patients who have registered [employer]   account)  ---------------------------------------------------------------------------  --------------  SCRIPT ANSWERS

## 2023-11-30 ENCOUNTER — PROCEDURE VISIT (OUTPATIENT)
Age: 68
End: 2023-11-30

## 2023-11-30 VITALS
HEIGHT: 67 IN | WEIGHT: 261 LBS | HEART RATE: 70 BPM | SYSTOLIC BLOOD PRESSURE: 138 MMHG | OXYGEN SATURATION: 98 % | DIASTOLIC BLOOD PRESSURE: 82 MMHG | BODY MASS INDEX: 40.97 KG/M2

## 2023-11-30 DIAGNOSIS — G89.29 CHRONIC PAIN OF BOTH KNEES: Primary | ICD-10-CM

## 2023-11-30 DIAGNOSIS — M25.562 CHRONIC PAIN OF BOTH KNEES: Primary | ICD-10-CM

## 2023-11-30 DIAGNOSIS — M25.561 CHRONIC PAIN OF BOTH KNEES: Primary | ICD-10-CM

## 2023-11-30 DIAGNOSIS — M17.0 PRIMARY OSTEOARTHRITIS OF BOTH KNEES: ICD-10-CM

## 2023-11-30 RX ORDER — IBUPROFEN 800 MG/1
TABLET ORAL
COMMUNITY
Start: 2023-10-17

## 2023-11-30 RX ORDER — HYALURONATE SODIUM 10 MG/ML
20 SYRINGE (ML) INTRAARTICULAR ONCE
Status: COMPLETED | OUTPATIENT
Start: 2023-11-30 | End: 2023-11-30

## 2023-11-30 RX ORDER — ONDANSETRON 4 MG/1
4 TABLET, ORALLY DISINTEGRATING ORAL EVERY 6 HOURS PRN
COMMUNITY
Start: 2023-10-27

## 2023-11-30 RX ORDER — LIDOCAINE HYDROCHLORIDE 10 MG/ML
6 INJECTION, SOLUTION INFILTRATION; PERINEURAL ONCE
Status: COMPLETED | OUTPATIENT
Start: 2023-11-30 | End: 2023-11-30

## 2023-11-30 RX ORDER — FAMOTIDINE 40 MG/1
TABLET, FILM COATED ORAL
COMMUNITY
Start: 2023-11-15

## 2023-11-30 RX ADMIN — Medication 20 MG: at 13:33

## 2023-11-30 RX ADMIN — LIDOCAINE HYDROCHLORIDE 6 ML: 10 INJECTION, SOLUTION INFILTRATION; PERINEURAL at 13:32

## 2023-11-30 ASSESSMENT — PATIENT HEALTH QUESTIONNAIRE - PHQ9
SUM OF ALL RESPONSES TO PHQ QUESTIONS 1-9: 0
2. FEELING DOWN, DEPRESSED OR HOPELESS: 0
SUM OF ALL RESPONSES TO PHQ QUESTIONS 1-9: 0
SUM OF ALL RESPONSES TO PHQ9 QUESTIONS 1 & 2: 0
1. LITTLE INTEREST OR PLEASURE IN DOING THINGS: 0

## 2023-11-30 NOTE — PROGRESS NOTES
Darrin Reyes presents today for   Chief Complaint   Patient presents with    Knee Pain     Bilateral       Is someone accompanying this pt? no    Is the patient using any DME equipment during OV? no    Depression Screening:       No data to display                Learning Assessment:  No flowsheet data found. Abuse Screening:       No data to display                Fall Risk  No flowsheet data found. OPIOID RISK TOOL  No flowsheet data found. Coordination of Care:  1. Have you been to the ER, urgent care clinic since your last visit? Yes had a surgery 10/27/2023  Hospitalized since your last visit? Yes - 1 day     2. Have you seen or consulted any other health care providers outside of the [address] [place] [address] since your last visit? no Include any pap smears or colon screening.  no
Per Dr. Nathaly Fitch,    She should keep her appointment with me 12/7 and we can try a bursa injection which we have done in the past.  We also discussed possible spine injection but I think we should repeat the bursa injection first.        I left a voicemail for the patient to call back.
MG tablet ceived the following from [facility]: Outside name: allopurinoL (ZYLOPRIM) 100 mg tablet      amLODIPine (NORVASC) 5 MG tablet ceived the following from [facility]: Outside name: amLODIPine (NORVASC) 5 mg tablet      ergocalciferol (ERGOCALCIFEROL) 1.25 MG (77784 UT) capsule ergocalciferol (vitamin D2) 1,250 mcg (50,000 unit) capsule   TAKE 1 CAPSULE BY MOUTH EVERY WEEK FOR VITAMIN D DEFICIENCY      lisinopril (PRINIVIL;ZESTRIL) 20 MG tablet lisinopril 20 mg tablet   TAKE 1 TABLET BY MOUTH EVERY DAY      methocarbamol (ROBAXIN) 750 MG tablet TAKE 2 TABLETS BY MOUTH TWICE A DAY AS NEEDED FOR PAIN      potassium chloride (KLOR-CON) 10 MEQ extended release tablet potassium chloride ER 10 mEq tablet,extended release   TAKE 1 TABLET BY MOUTH EVERY DAY      pravastatin (PRAVACHOL) 10 MG tablet pravastatin 10 mg tablet   TAKE 1 TABLET BY MOUTH EVERY DAY      famotidine (PEPCID) 20 MG tablet  (Patient not taking: Reported on 11/30/2023)      furosemide (LASIX) 40 MG tablet furosemide 40 mg tablet   TAKE 1 TABLET BY MOUTH TWICE A DAY       No current facility-administered medications for this visit. Allergies   Allergen Reactions    Penicillins Hives, Itching and Rash         Physical Exam       Vital Signs: There were no vitals taken for this visit. General: ??????? Well nourished and well developed female without any acute distress    Psychiatric: ?  Alert and oriented x 3 with normal mood     HEENT: ???????? Atraumatic    Respiratory:   Breathing non-labored and non dyspneic    CV: ???????????????? Peripheral pulses intact, no peripheral edema    Skin: ????????????? No rashes          Neurologic: ??         Sensation: normal and grossly intact thebilateral, lower extremity(s)    Strength: 5/5 in the bilateral, lower extremity(s)   Reflexes: reveals 2+ symmetric DTRs throughout  LE   Gait: normal       Musculoskeletal: Lumbar Exam       Inspection:    Alignment: Abnormal

## 2023-12-04 ENCOUNTER — TELEPHONE (OUTPATIENT)
Age: 68
End: 2023-12-04

## 2023-12-04 NOTE — TELEPHONE ENCOUNTER
Patient returned a call to nurse KRISTIE from today. She said we needed something other than to confirm appt for 12/7 but she isn't sure what. Please try to reach patient again at [phone].

## 2024-02-27 ENCOUNTER — OFFICE VISIT (OUTPATIENT)
Age: 69
End: 2024-02-27
Payer: MEDICARE

## 2024-02-27 VITALS — WEIGHT: 254 LBS | HEIGHT: 67 IN | BODY MASS INDEX: 39.87 KG/M2

## 2024-02-27 DIAGNOSIS — M70.62 GREATER TROCHANTERIC BURSITIS OF LEFT HIP: Primary | ICD-10-CM

## 2024-02-27 DIAGNOSIS — M17.0 PRIMARY OSTEOARTHRITIS OF BOTH KNEES: ICD-10-CM

## 2024-02-27 DIAGNOSIS — M54.42 CHRONIC BILATERAL LOW BACK PAIN WITH LEFT-SIDED SCIATICA: ICD-10-CM

## 2024-02-27 DIAGNOSIS — G89.29 CHRONIC PAIN OF BOTH KNEES: ICD-10-CM

## 2024-02-27 DIAGNOSIS — M25.561 CHRONIC PAIN OF BOTH KNEES: ICD-10-CM

## 2024-02-27 DIAGNOSIS — G89.29 CHRONIC BILATERAL LOW BACK PAIN WITH LEFT-SIDED SCIATICA: ICD-10-CM

## 2024-02-27 DIAGNOSIS — M25.562 CHRONIC PAIN OF BOTH KNEES: ICD-10-CM

## 2024-02-27 PROCEDURE — 20611 DRAIN/INJ JOINT/BURSA W/US: CPT | Performed by: PHYSICAL MEDICINE & REHABILITATION

## 2024-02-27 PROCEDURE — 1123F ACP DISCUSS/DSCN MKR DOCD: CPT | Performed by: PHYSICAL MEDICINE & REHABILITATION

## 2024-02-27 PROCEDURE — 99212 OFFICE O/P EST SF 10 MIN: CPT | Performed by: PHYSICAL MEDICINE & REHABILITATION

## 2024-02-27 RX ORDER — TRIAMCINOLONE ACETONIDE 40 MG/ML
40 INJECTION, SUSPENSION INTRA-ARTICULAR; INTRAMUSCULAR ONCE
Status: COMPLETED | OUTPATIENT
Start: 2024-02-27 | End: 2024-02-27

## 2024-02-27 RX ORDER — LIDOCAINE HYDROCHLORIDE 10 MG/ML
6 INJECTION, SOLUTION INFILTRATION; PERINEURAL ONCE
Status: COMPLETED | OUTPATIENT
Start: 2024-02-27 | End: 2024-02-27

## 2024-02-27 RX ADMIN — TRIAMCINOLONE ACETONIDE 40 MG: 40 INJECTION, SUSPENSION INTRA-ARTICULAR; INTRAMUSCULAR at 09:28

## 2024-02-27 RX ADMIN — LIDOCAINE HYDROCHLORIDE 6 ML: 10 INJECTION, SOLUTION INFILTRATION; PERINEURAL at 09:27

## 2024-02-27 ASSESSMENT — PATIENT HEALTH QUESTIONNAIRE - PHQ9
SUM OF ALL RESPONSES TO PHQ QUESTIONS 1-9: 0
SUM OF ALL RESPONSES TO PHQ QUESTIONS 1-9: 0
2. FEELING DOWN, DEPRESSED OR HOPELESS: 0
1. LITTLE INTEREST OR PLEASURE IN DOING THINGS: 0
SUM OF ALL RESPONSES TO PHQ QUESTIONS 1-9: 0
SUM OF ALL RESPONSES TO PHQ QUESTIONS 1-9: 0
SUM OF ALL RESPONSES TO PHQ9 QUESTIONS 1 & 2: 0

## 2024-02-27 NOTE — PROGRESS NOTES
Deonna Fuentes presents today for   Chief Complaint   Patient presents with    Back Pain     lumbar    Knee Pain     Bilat knee pain       Is someone accompanying this pt? no    Is the patient using any DME equipment during OV? no    Depression Screening:       No data to display                Learning Assessment:  Failed to redirect to the Timeline version of the [employer] SmartLink.    Abuse Screening:       No data to display                Fall Risk  Failed to redirect to the Timeline version of the [employer] SmartLink.    OPIOID RISK TOOL  Failed to redirect to the Timeline version of the [employer] SmartLink.    Coordination of Care:  1. Have you been to the ER, urgent care clinic since your last visit? no  Hospitalized since your last visit? no    2. Have you seen or consulted any other health care providers outside of the [facility] since your last visit? no Include any pap smears or colon screening.           
reaction, local tissue breakdown, injury to soft tissue and/or nerves and seizure, the patient indicated understanding and agreed to proceed.          Procedural pause conducted to verify: correct patient identity, procedure to be performed and, as applicable, correct side and site, correct patient position, availability of any special equipment or other special requirements.          Justification for use of ultrasound guidance: The use of direct sonographic visualization (rather than a non-guided injection) was required to ensure accurate injection placement for diagnostic specificity, to maximize clinical efficacy, and for safety purposes to minimize risk of bleeding or injury to nearby structures.           Technique: The procedure was carried out under sterile technique utilizing a sterile ultrasound transducer cover and sterile ultrasound gel. Pre-procedural scanning was performed to determine optimal approach for the procedure. The patient was prepped and draped in the usual sterile fashion.           Patient position: side lying on right      Approach: posterior to anterior     Needle: 22 gauge 3.5 inch     Details: Live sonographic guidance with a 12 MHz transducer was used throughout the procedure. A 25 gauge 1.5 inch needle with 3 mL 1% lidocaine was used for local anesthesia.  Then a 22 gauge 3.5 inch needle was advanced into the target area 3 mL of lidocaine 1% and 40 mg of kenalog was injected.         Post-Procedure Instructions: The patient tolerated the procedure well without complication and was discharged in good condition after a short observation period. The patient was instructed to avoid submerging the procedure site in water for 48-72 hours. The patient was instructed to contact me with any questions pertaining to the procedure          Key images were saved.          Impression: Technically successful sonographically guided left GT bursa          Robb Marie MD

## 2024-03-13 ENCOUNTER — TELEMEDICINE (OUTPATIENT)
Age: 69
End: 2024-03-13
Payer: MEDICARE

## 2024-03-13 DIAGNOSIS — G89.29 CHRONIC PAIN OF BOTH KNEES: ICD-10-CM

## 2024-03-13 DIAGNOSIS — G89.29 CHRONIC BILATERAL LOW BACK PAIN WITH LEFT-SIDED SCIATICA: ICD-10-CM

## 2024-03-13 DIAGNOSIS — M17.0 PRIMARY OSTEOARTHRITIS OF BOTH KNEES: ICD-10-CM

## 2024-03-13 DIAGNOSIS — M70.62 GREATER TROCHANTERIC BURSITIS OF LEFT HIP: Primary | ICD-10-CM

## 2024-03-13 DIAGNOSIS — M25.562 CHRONIC PAIN OF BOTH KNEES: ICD-10-CM

## 2024-03-13 DIAGNOSIS — M25.561 CHRONIC PAIN OF BOTH KNEES: ICD-10-CM

## 2024-03-13 DIAGNOSIS — M54.42 CHRONIC BILATERAL LOW BACK PAIN WITH LEFT-SIDED SCIATICA: ICD-10-CM

## 2024-03-13 PROCEDURE — 99442 PR PHYS/QHP TELEPHONE EVALUATION 11-20 MIN: CPT | Performed by: PHYSICAL MEDICINE & REHABILITATION

## 2024-03-13 NOTE — PROGRESS NOTES
Deonna Fuentes presents today for   Chief Complaint   Patient presents with    Pain    Knee Pain     Left         Is someone accompanying this pt? no    Is the patient using any DME equipment during OV? no    Depression Screening:       No data to display                Learning Assessment:  Failed to redirect to the Timeline version of the "[employer] SmartLink.    Abuse Screening:       No data to display                Fall Risk  Failed to redirect to the Timeline version of the "[employer] SmartLink.    OPIOID RISK TOOL  Failed to redirect to the Timeline version of the "[employer] SmartLink.    Coordination of Care:  1. Have you been to the ER, urgent care clinic since your last visit? no  Hospitalized since your last visit? no    2. Have you seen or consulted any other health care providers outside of the [facility] since your last visit? no Include any pap smears or colon screening. no      
today. All questions were answered.     F/U in May 2024 and can order repeat gel injections  _#provided to call weight loss clinic and schedule seminar          Robb Marie MD   [facility]     Documentation:  I communicated with the patient and/or health care decision maker about low back pain, left knee Pain, left hip pain.   Details of this discussion including any medical advice provided:     Total Time: minutes: 11-20 minutes 12    Deonna Fuentes was evaluated through a synchronous (real-time) audio encounter. Patient identification was verified at the start of the visit. She (or guardian if applicable) is aware that this is a billable service, which includes applicable co-pays. This visit was conducted with the patient's (and/or legal guardian's) verbal consent. She has not had a related appointment within my department in the past 7 days or scheduled within the next 24 hours.   The patient was located at Home: [address] [place] [id number].  The provider was located at Facility (Appt Dept): [address] [place] [address]  [address]  [place] [address].    Note: not billable if this call serves to triage the patient into an appointment for the relevant concern  {STOP! Confirm you are appropriately licensed, registered, or certified to deliver care in the state where the patient is located as indicated above. If you are not or unsure, please re-schedule the visit. Yes   Deonna Fuentes is a 68 y.o. female evaluated via telephone on 3/13/2024 for Pain and Knee Pain (Left/)  .        Robb Marie MD

## 2024-03-13 NOTE — PATIENT INSTRUCTIONS
Patient: Call [facility] to scheduled our next weight loss options seminar at [phone]

## 2024-06-27 ENCOUNTER — TELEMEDICINE (OUTPATIENT)
Age: 69
End: 2024-06-27
Payer: MEDICARE

## 2024-06-27 DIAGNOSIS — G89.29 CHRONIC PAIN OF BOTH KNEES: Primary | ICD-10-CM

## 2024-06-27 DIAGNOSIS — M25.561 CHRONIC PAIN OF BOTH KNEES: Primary | ICD-10-CM

## 2024-06-27 DIAGNOSIS — M25.562 CHRONIC PAIN OF BOTH KNEES: Primary | ICD-10-CM

## 2024-06-27 DIAGNOSIS — M17.0 PRIMARY OSTEOARTHRITIS OF BOTH KNEES: ICD-10-CM

## 2024-06-27 PROCEDURE — 99442 PR PHYS/QHP TELEPHONE EVALUATION 11-20 MIN: CPT | Performed by: PHYSICAL MEDICINE & REHABILITATION

## 2024-06-27 NOTE — PROGRESS NOTES
[facility]  [address] [place] [address], [address]   [place] [address]   Phone: [phone]   Fax: [phone]         Patient: Deonna Fuentes                                                                               MRN: [id number]         YOB: 1955           AGE: 66 y.o.               PCP: None     Reason for Consultation: Hip Pain (Left) and Leg Pain (Left)         HPI:   Deonna Fuentes is a 66 y.o. female with relevant PMH of HTN, RAMEHS, gout, prior recent PE- now on eliquis,  bilateral hip replacement  who presented with left low back/gluteal  pain radiating  down the left leg towards the knee.  We have tried a GT bursa injections which helped.   Mri lumbar spine 7/13/23 with severe DDD L2-3 , L4-5 disc herniation, facet rathritis L3-S1.      She also reports bilateral knee pain. Has had viscosupplementation in the past which has been helpful. She completed a series of 3 euflexxa injections 11/30/2023 (there was a delay due to insurance issues and it was started 8/2023).   She would like to repeat viscosupplemenation    Since her last visit she was hospitalized in may 2024 with a PE and she is not on anticoagulation             Neurologic symptoms: + numbness- left leg, tingling, weakness, bowel or bladder changes.  No recent falls        Location: The pain is located in the left low back pain/buttock pain    Radiation: The pain does radiate down the left lateral hip and leg towards the knee  and foot   Pain Score: Currently: 4/10     Quality: Pain is of a Stabbing quality.     Aggravating: Pain is exacerbated by walking, standing, and exercise   Alleviating: The pain is alleviated by sitting      Prior Treatments:   Physical therapy: Yes started 5/4/23- currently in PT    Injections:  2/27/2023- left GT bursa injection  8/3/2023- bilateral euflexxa injection  8/10/2023- bilateral euflexxa injection  11/30/2023- bilateral euflexxa injection

## 2024-06-27 NOTE — PROGRESS NOTES
Deonna Fuentes presents today for   Chief Complaint   Patient presents with    Joint Swelling     Bilat knees       Is someone accompanying this pt? no    Is the patient using any DME equipment during OV? no    Depression Screening:       No data to display                Learning Assessment:  Failed to redirect to the Timeline version of the [employer] SmartLink.    Abuse Screening:       No data to display                Fall Risk  Failed to redirect to the Timeline version of the [employer] SmartLink.    OPIOID RISK TOOL  Failed to redirect to the Timeline version of the [employer] SmartLink.    Coordination of Care:  1. Have you been to the ER, urgent care clinic since your last visit? no  Hospitalized since your last visit? no    2. Have you seen or consulted any other health care providers outside of the [facility] since your last visit? no Include any pap smears or colon screening. no

## 2024-07-17 ENCOUNTER — TELEPHONE (OUTPATIENT)
Age: 69
End: 2024-07-17

## 2024-07-17 NOTE — TELEPHONE ENCOUNTER
Patient called to follow up on her authorization for gel injections for bilateral knees.    Please review and advise patient, [phone]

## 2024-10-03 ENCOUNTER — OFFICE VISIT (OUTPATIENT)
Age: 69
End: 2024-10-03

## 2024-10-03 VITALS
SYSTOLIC BLOOD PRESSURE: 108 MMHG | HEIGHT: 67 IN | DIASTOLIC BLOOD PRESSURE: 77 MMHG | WEIGHT: 237 LBS | BODY MASS INDEX: 37.2 KG/M2

## 2024-10-03 DIAGNOSIS — M17.0 PRIMARY OSTEOARTHRITIS OF BOTH KNEES: Primary | ICD-10-CM

## 2024-10-03 DIAGNOSIS — M17.11 ARTHRITIS OF RIGHT KNEE: ICD-10-CM

## 2024-10-03 DIAGNOSIS — M17.12 ARTHRITIS OF LEFT KNEE: ICD-10-CM

## 2024-10-03 RX ORDER — HYALURONATE SODIUM 10 MG/ML
20 SYRINGE (ML) INTRAARTICULAR ONCE
Status: COMPLETED | OUTPATIENT
Start: 2024-10-03 | End: 2024-10-03

## 2024-10-03 RX ORDER — LIDOCAINE HYDROCHLORIDE 10 MG/ML
6 INJECTION, SOLUTION INFILTRATION; PERINEURAL ONCE
Status: COMPLETED | OUTPATIENT
Start: 2024-10-03 | End: 2024-10-03

## 2024-10-03 RX ADMIN — Medication 20 MG: at 11:27

## 2024-10-03 RX ADMIN — LIDOCAINE HYDROCHLORIDE 6 ML: 10 INJECTION, SOLUTION INFILTRATION; PERINEURAL at 11:24

## 2024-10-03 RX ADMIN — Medication 20 MG: at 11:29

## 2024-10-03 NOTE — PROGRESS NOTES
Deonna Fuentes presents today for   Chief Complaint   Patient presents with    Knee Pain     Bilat knees         Is someone accompanying this pt? son    Is the patient using any DME equipment during OV? no    Depression Screening:       No data to display                Learning Assessment:  Failed to redirect to the Timeline version of the [employer] SmartLink.    Abuse Screening:       No data to display                Fall Risk  Failed to redirect to the Timeline version of the [employer] SmartLink.    OPIOID RISK TOOL  Failed to redirect to the Timeline version of the [employer] SmartLink.    Coordination of Care:  1. Have you been to the ER, urgent care clinic since your last visit? no  Hospitalized since your last visit? no    2. Have you seen or consulted any other health care providers outside of the [facility] since your last visit? no Include any pap smears or colon screeningno

## 2024-10-03 NOTE — PROGRESS NOTES
[facility]  [address] [place] [address], [address]  [place] [address]  Phone: [phone]  Fax: [phone]      Encounter Date: 10/3/2024          Diagnosis:    Diagnosis Orders   1. Primary osteoarthritis of both knees  AMB POC US DRAIN/INJECT LARGE JOINT/BURSA    lidocaine 1 % injection 6 mL    sodium hyaluronate (EUFLEXXA, HYALGAN) injection 20 mg    sodium hyaluronate (EUFLEXXA, HYALGAN) injection 20 mg                Requesting Provider: Oral Marie*          Indication: bilateral knee arthritis          Procedure: Sonographically guided  left and right euflexxa injections          Informed Consent: Following denial of allergy and review of potential side effects and complications including, but not limited to, infection, allergic reaction, local tissue breakdown, injury to soft tissue and/or nerves and seizure, the patient indicated understanding and agreed to proceed.          Procedural pause conducted to verify: correct patient identity, procedure to be performed and, as applicable, correct side and site, correct patient position, availability of any special equipment or other special requirements.          Justification for use of ultrasound guidance: The use of direct sonographic visualization (rather than a non-guided injection) was required to ensure accurate injection placement for diagnostic specificity, to maximize clinical efficacy, and for safety purposes to minimize risk of bleeding or injury to nearby structures.           Technique: The procedure was carried out under sterile technique utilizing a sterile ultrasound transducer cover and sterile ultrasound gel. Pre-procedural scanning was performed to determine optimal approach for the procedure. The patient was prepped and draped in the usual sterile fashion.         Left knee  Patient position: seated knee flexed 30 degrees     Approach: lateral to medial      Needle: 21 gauge 2 inch

## 2024-10-10 ENCOUNTER — OFFICE VISIT (OUTPATIENT)
Age: 69
End: 2024-10-10

## 2024-10-10 VITALS
WEIGHT: 232 LBS | BODY MASS INDEX: 36.41 KG/M2 | HEIGHT: 67 IN | SYSTOLIC BLOOD PRESSURE: 105 MMHG | DIASTOLIC BLOOD PRESSURE: 73 MMHG

## 2024-10-10 DIAGNOSIS — M17.12 ARTHRITIS OF LEFT KNEE: ICD-10-CM

## 2024-10-10 DIAGNOSIS — M70.62 GREATER TROCHANTERIC BURSITIS OF LEFT HIP: ICD-10-CM

## 2024-10-10 DIAGNOSIS — M17.11 ARTHRITIS OF RIGHT KNEE: Primary | ICD-10-CM

## 2024-10-10 RX ORDER — LIDOCAINE HYDROCHLORIDE 10 MG/ML
4 INJECTION, SOLUTION INFILTRATION; PERINEURAL ONCE
Status: COMPLETED | OUTPATIENT
Start: 2024-10-10 | End: 2024-10-10

## 2024-10-10 RX ORDER — HYALURONATE SODIUM 10 MG/ML
20 SYRINGE (ML) INTRAARTICULAR ONCE
Status: COMPLETED | OUTPATIENT
Start: 2024-10-10 | End: 2024-10-10

## 2024-10-10 RX ORDER — LIDOCAINE HYDROCHLORIDE 10 MG/ML
6 INJECTION, SOLUTION INFILTRATION; PERINEURAL ONCE
Status: COMPLETED | OUTPATIENT
Start: 2024-10-10 | End: 2024-10-10

## 2024-10-10 RX ORDER — TRIAMCINOLONE ACETONIDE 40 MG/ML
40 INJECTION, SUSPENSION INTRA-ARTICULAR; INTRAMUSCULAR ONCE
Status: COMPLETED | OUTPATIENT
Start: 2024-10-10 | End: 2024-10-10

## 2024-10-10 RX ADMIN — TRIAMCINOLONE ACETONIDE 40 MG: 40 INJECTION, SUSPENSION INTRA-ARTICULAR; INTRAMUSCULAR at 13:33

## 2024-10-10 RX ADMIN — Medication 20 MG: at 13:32

## 2024-10-10 RX ADMIN — Medication 20 MG: at 13:28

## 2024-10-10 RX ADMIN — LIDOCAINE HYDROCHLORIDE 6 ML: 10 INJECTION, SOLUTION INFILTRATION; PERINEURAL at 13:26

## 2024-10-10 RX ADMIN — LIDOCAINE HYDROCHLORIDE 4 ML: 10 INJECTION, SOLUTION INFILTRATION; PERINEURAL at 13:24

## 2024-10-10 NOTE — PROGRESS NOTES
Deonna Fuentes presents today for   Chief Complaint   Patient presents with    Knee Pain     Bilat knees       Is someone accompanying this pt? no    Is the patient using any DME equipment during OV? no    Depression Screening:       No data to display                Learning Assessment:  Failed to redirect to the Timeline version of the [employer] SmartLink.    Abuse Screening:       No data to display                Fall Risk  Failed to redirect to the Timeline version of the [employer] SmartLink.    OPIOID RISK TOOL  Failed to redirect to the Timeline version of the [employer] SmartLink.    Coordination of Care:  1. Have you been to the ER, urgent care clinic since your last visit? no  Hospitalized since your last visit? no    2. Have you seen or consulted any other health care providers outside of the [facility] since your last visit? no Include any pap smears or colon screening. no      
mL 1% lidocaine was used for local anesthesia.  Then a 21 gauge 2 inch needle was advanced into the target area  and 4 mL of  lidocaine 1% and 40 mg of kenalog was injected.              Post-Procedure Instructions: The patient tolerated the procedure well without complication and was discharged in good condition after a short observation period. The patient was instructed to avoid submerging the procedure site in water for 48-72 hours. The patient was instructed to contact me with any questions pertaining to the procedure          Key images were saved.          Impression: Technically successful sonographically guided left greater trochanteric bursa injection          Robb Marie MD

## 2024-10-17 ENCOUNTER — OFFICE VISIT (OUTPATIENT)
Age: 69
End: 2024-10-17

## 2024-10-17 VITALS
WEIGHT: 230 LBS | HEIGHT: 67 IN | BODY MASS INDEX: 36.1 KG/M2 | DIASTOLIC BLOOD PRESSURE: 83 MMHG | SYSTOLIC BLOOD PRESSURE: 133 MMHG

## 2024-10-17 DIAGNOSIS — M17.11 ARTHRITIS OF RIGHT KNEE: Primary | ICD-10-CM

## 2024-10-17 DIAGNOSIS — M17.12 ARTHRITIS OF LEFT KNEE: ICD-10-CM

## 2024-10-17 RX ORDER — HYALURONATE SODIUM 10 MG/ML
20 SYRINGE (ML) INTRAARTICULAR ONCE
Status: COMPLETED | OUTPATIENT
Start: 2024-10-17 | End: 2024-10-17

## 2024-10-17 RX ORDER — LIDOCAINE HYDROCHLORIDE 10 MG/ML
6 INJECTION, SOLUTION INFILTRATION; PERINEURAL ONCE
Status: COMPLETED | OUTPATIENT
Start: 2024-10-17 | End: 2024-10-17

## 2024-10-17 RX ADMIN — Medication 20 MG: at 11:15

## 2024-10-17 RX ADMIN — Medication 20 MG: at 11:13

## 2024-10-17 RX ADMIN — LIDOCAINE HYDROCHLORIDE 6 ML: 10 INJECTION, SOLUTION INFILTRATION; PERINEURAL at 11:11

## 2024-10-17 NOTE — PROGRESS NOTES
[facility]  [address] [place] [address], [address]  [place] [address]  Phone: [phone]  Fax: [phone]      Encounter Date: 10/17/2024          Diagnosis:    Diagnosis Orders   1. Arthritis of right knee  AMB POC US DRAIN/INJECT LARGE JOINT/BURSA    lidocaine 1 % injection 6 mL    sodium hyaluronate (EUFLEXXA, HYALGAN) injection 20 mg      2. Arthritis of left knee  AMB POC US DRAIN/INJECT LARGE JOINT/BURSA    lidocaine 1 % injection 6 mL    sodium hyaluronate (EUFLEXXA, HYALGAN) injection 20 mg                Requesting Provider: Oral Marie*          Indication: bilateral knee arthritis          Procedure: Sonographically guided  left and right euflexxa injections          Informed Consent: Following denial of allergy and review of potential side effects and complications including, but not limited to, infection, allergic reaction, local tissue breakdown, injury to soft tissue and/or nerves and seizure, the patient indicated understanding and agreed to proceed.          Procedural pause conducted to verify: correct patient identity, procedure to be performed and, as applicable, correct side and site, correct patient position, availability of any special equipment or other special requirements.          Justification for use of ultrasound guidance: The use of direct sonographic visualization (rather than a non-guided injection) was required to ensure accurate injection placement for diagnostic specificity, to maximize clinical efficacy, and for safety purposes to minimize risk of bleeding or injury to nearby structures.           Technique: The procedure was carried out under sterile technique utilizing a sterile ultrasound transducer cover and sterile ultrasound gel. Pre-procedural scanning was performed to determine optimal approach for the procedure. The patient was prepped and draped in the usual sterile fashion.         Left knee  Patient position: seated

## 2024-10-17 NOTE — PROGRESS NOTES
Deonna Fuentes presents today for   Chief Complaint   Patient presents with    Knee Pain     Bilat knees       Is someone accompanying this pt? no    Is the patient using any DME equipment during OV? no    Depression Screening:       No data to display                Learning Assessment:  Failed to redirect to the Timeline version of the [email or web address] SmartLink.    Abuse Screening:       No data to display                Fall Risk  Failed to redirect to the Timeline version of the [employer] SmartLink.    OPIOID RISK TOOL  Failed to redirect to the Timeline version of the [employer] SmartLink.    Coordination of Care:  1. Have you been to the ER, urgent care clinic since your last visit? no  Hospitalized since your last visit? no    2. Have you seen or consulted any other health care providers outside of the [facility] since your last visit? no Include any pap smears or colon screening. no

## 2025-02-19 SDOH — HEALTH STABILITY: PHYSICAL HEALTH
ON AVERAGE, HOW MANY DAYS PER WEEK DO YOU ENGAGE IN MODERATE TO STRENUOUS EXERCISE (LIKE A BRISK WALK)?: PATIENT DECLINED

## 2025-03-06 ENCOUNTER — OFFICE VISIT (OUTPATIENT)
Age: 70
End: 2025-03-06

## 2025-03-06 VITALS — WEIGHT: 236 LBS | BODY MASS INDEX: 37.04 KG/M2 | HEIGHT: 67 IN

## 2025-03-06 DIAGNOSIS — M25.461 EFFUSION OF RIGHT KNEE: ICD-10-CM

## 2025-03-06 DIAGNOSIS — M21.061 ACQUIRED VALGUS DEFORMITY KNEE, RIGHT: ICD-10-CM

## 2025-03-06 DIAGNOSIS — Z86.711 HISTORY OF PULMONARY EMBOLUS (PE): ICD-10-CM

## 2025-03-06 DIAGNOSIS — M17.11 ARTHRITIS OF RIGHT KNEE: Primary | ICD-10-CM

## 2025-03-06 DIAGNOSIS — M25.661 DECREASED RANGE OF MOTION (ROM) OF RIGHT KNEE: ICD-10-CM

## 2025-03-06 DIAGNOSIS — R26.9 ABNORMAL GAIT: ICD-10-CM

## 2025-03-06 RX ORDER — BUPIVACAINE HYDROCHLORIDE 5 MG/ML
17 INJECTION, SOLUTION PERINEURAL ONCE
Status: COMPLETED | OUTPATIENT
Start: 2025-03-06 | End: 2025-03-06

## 2025-03-06 RX ORDER — TRIAMCINOLONE ACETONIDE 40 MG/ML
80 INJECTION, SUSPENSION INTRA-ARTICULAR; INTRAMUSCULAR ONCE
Status: COMPLETED | OUTPATIENT
Start: 2025-03-06 | End: 2025-03-06

## 2025-03-06 RX ADMIN — TRIAMCINOLONE ACETONIDE 80 MG: 40 INJECTION, SUSPENSION INTRA-ARTICULAR; INTRAMUSCULAR at 14:27

## 2025-03-06 RX ADMIN — BUPIVACAINE HYDROCHLORIDE 85 MG: 5 INJECTION, SOLUTION PERINEURAL at 14:27

## 2025-03-06 NOTE — PROGRESS NOTES
[facility] AMBULATORY OFFICE NOTE    Patient: Deonna Fuentes                MRN: [id number]       SSN: [id number]  YOB: 1955        AGE: 69 y.o.        SEX: female      OFFICE NOTE DICTATED    PCP: Susi Talamantes APRN - NP  03/06/25    Chief Complaint   Patient presents with    Knee Pain     Right         HISTORY:    Deonna Fuentes is a 69 y.o. female returns to the office with a recurrence of acute on chronic bilateral knee pain right greater than left.  She was last seen under the care of Dr. Marinelli October 2024 at which time she received the last series of hyaluronic acid injections.  They only helped her knee pain for about 2 to 3 weeks.  She has a distant history of receiving cortisone for knee discomfort.  She has been trying to work on her weight with a stretching and home exercise program up.  She is counting calories and trying to eliminate any extra sugar.  She has failed with oral anti-inflammatory/analgesic medications for symptom relief.  Due to bilateral knee pain she is limited in her abilities to only walk for short distances and stand for short periods of time.  Climbing and descending stairs create a great deal of discomfort in both knees.    Failed to redirect to the Timeline version of the [employer] SmartLink.    No results found for: \"HBA1C\", \"[id number]\"      3/6/2025     2:00 PM 10/17/2024    10:11 AM 10/10/2024    10:02 AM 10/3/2024    10:12 AM 2/27/2024     8:21 AM 11/30/2023    11:56 AM 8/10/2023    11:47 AM   Weight Metrics   Weight 236 lb 230 lb 232 lb 237 lb 254 lb 261 lb 251 lb   BMI (Calculated) 37 kg/m2 36.1 kg/m2 36.4 kg/m2 37.2 kg/m2 39.9 kg/m2 41 kg/m2 39.4 kg/m2          Problem List Items Addressed This Visit    None  Visit Diagnoses       Arthritis of right knee    -  Primary    Relevant Orders    [49727] Knee 3V (Completed)    Acquired valgus deformity knee, right        Effusion of right knee        Decreased range of motion

## 2025-04-10 ENCOUNTER — OFFICE VISIT (OUTPATIENT)
Age: 70
End: 2025-04-10

## 2025-04-10 VITALS — BODY MASS INDEX: 36.96 KG/M2 | HEIGHT: 67 IN

## 2025-04-10 DIAGNOSIS — M25.662 DECREASED RANGE OF MOTION (ROM) OF LEFT KNEE: ICD-10-CM

## 2025-04-10 DIAGNOSIS — M23.8X2 KNEE CREPITUS, LEFT: ICD-10-CM

## 2025-04-10 DIAGNOSIS — M25.462 EFFUSION OF LEFT KNEE: ICD-10-CM

## 2025-04-10 DIAGNOSIS — M17.12 ARTHRITIS OF LEFT KNEE: Primary | ICD-10-CM

## 2025-04-10 DIAGNOSIS — M17.11 ARTHRITIS OF RIGHT KNEE: ICD-10-CM

## 2025-04-10 RX ORDER — BUPIVACAINE HYDROCHLORIDE 5 MG/ML
17 INJECTION, SOLUTION PERINEURAL ONCE
Status: COMPLETED | OUTPATIENT
Start: 2025-04-10 | End: 2025-04-10

## 2025-04-10 RX ORDER — TRIAMCINOLONE ACETONIDE 40 MG/ML
80 INJECTION, SUSPENSION INTRA-ARTICULAR; INTRAMUSCULAR ONCE
Status: COMPLETED | OUTPATIENT
Start: 2025-04-10 | End: 2025-04-10

## 2025-04-10 RX ADMIN — TRIAMCINOLONE ACETONIDE 80 MG: 40 INJECTION, SUSPENSION INTRA-ARTICULAR; INTRAMUSCULAR at 12:00

## 2025-04-10 RX ADMIN — BUPIVACAINE HYDROCHLORIDE 85 MG: 5 INJECTION, SOLUTION PERINEURAL at 12:00

## 2025-04-10 NOTE — PROGRESS NOTES
sound-a-like computer induced   dictation errors will populate the report.  Quite often unanticipated grammatical, syntax, homophones, and other interpretive errors are inadvertently transcribed by the computer software.  Please disregard these errors.  Please excuse any errors that have escaped final proofreading.        Tien SANZ, APC, MPAS, PA-C  [facility]  [facility]